# Patient Record
Sex: FEMALE | Race: WHITE | Employment: OTHER | URBAN - METROPOLITAN AREA
[De-identification: names, ages, dates, MRNs, and addresses within clinical notes are randomized per-mention and may not be internally consistent; named-entity substitution may affect disease eponyms.]

---

## 2017-04-02 ENCOUNTER — APPOINTMENT (OUTPATIENT)
Dept: CT IMAGING | Age: 77
End: 2017-04-02
Payer: MEDICARE

## 2017-04-02 ENCOUNTER — HOSPITAL ENCOUNTER (EMERGENCY)
Age: 77
Discharge: HOME | End: 2017-04-02
Attending: EMERGENCY MEDICINE
Payer: MEDICARE

## 2017-04-02 VITALS
HEART RATE: 75 BPM | OXYGEN SATURATION: 90 % | SYSTOLIC BLOOD PRESSURE: 108 MMHG | WEIGHT: 129.2 LBS | TEMPERATURE: 97.8 F | RESPIRATION RATE: 16 BRPM | DIASTOLIC BLOOD PRESSURE: 58 MMHG | HEIGHT: 63 IN | BODY MASS INDEX: 22.89 KG/M2

## 2017-04-02 DIAGNOSIS — S00.03XA SCALP HEMATOMA, INITIAL ENCOUNTER: Primary | ICD-10-CM

## 2017-04-02 PROCEDURE — 90715 TDAP VACCINE 7 YRS/> IM: CPT | Performed by: NURSE PRACTITIONER

## 2017-04-02 PROCEDURE — 72125 CT NECK SPINE W/O DYE: CPT

## 2017-04-02 PROCEDURE — 6360000002 HC RX W HCPCS: Performed by: NURSE PRACTITIONER

## 2017-04-02 PROCEDURE — 99283 EMERGENCY DEPT VISIT LOW MDM: CPT

## 2017-04-02 PROCEDURE — 90471 IMMUNIZATION ADMIN: CPT | Performed by: NURSE PRACTITIONER

## 2017-04-02 PROCEDURE — 70450 CT HEAD/BRAIN W/O DYE: CPT

## 2017-04-02 RX ORDER — FAMOTIDINE 20 MG/1
20 TABLET, FILM COATED ORAL 2 TIMES DAILY
COMMUNITY

## 2017-04-02 RX ORDER — OXYCODONE HYDROCHLORIDE AND ACETAMINOPHEN 5; 325 MG/1; MG/1
1 TABLET ORAL EVERY 4 HOURS PRN
COMMUNITY

## 2017-04-02 RX ORDER — FUROSEMIDE 20 MG/1
40 TABLET ORAL NIGHTLY
COMMUNITY

## 2017-04-02 RX ORDER — PRIMIDONE 50 MG/1
50 TABLET ORAL 2 TIMES DAILY
COMMUNITY

## 2017-04-02 RX ORDER — CITALOPRAM 20 MG/1
20 TABLET ORAL DAILY
COMMUNITY

## 2017-04-02 RX ADMIN — TETANUS TOXOID, REDUCED DIPHTHERIA TOXOID AND ACELLULAR PERTUSSIS VACCINE, ADSORBED 0.5 ML: 5; 2.5; 8; 8; 2.5 SUSPENSION INTRAMUSCULAR at 18:22

## 2017-04-02 ASSESSMENT — ENCOUNTER SYMPTOMS
SHORTNESS OF BREATH: 0
NAUSEA: 0
DIARRHEA: 0
SORE THROAT: 0
CONSTIPATION: 0
ABDOMINAL PAIN: 0
COUGH: 0
VOMITING: 0
SINUS PRESSURE: 0
RHINORRHEA: 0
WHEEZING: 0
COLOR CHANGE: 0

## 2017-04-02 ASSESSMENT — PAIN SCALES - GENERAL: PAINLEVEL_OUTOF10: 5

## 2018-10-17 ENCOUNTER — OFFICE VISIT (OUTPATIENT)
Dept: DERMATOLOGY | Age: 78
End: 2018-10-17
Payer: COMMERCIAL

## 2018-10-17 VITALS
HEART RATE: 64 BPM | OXYGEN SATURATION: 98 % | WEIGHT: 118.6 LBS | BODY MASS INDEX: 21.01 KG/M2 | DIASTOLIC BLOOD PRESSURE: 71 MMHG | SYSTOLIC BLOOD PRESSURE: 132 MMHG

## 2018-10-17 DIAGNOSIS — Q82.5 CONGENITAL NEVUS: ICD-10-CM

## 2018-10-17 DIAGNOSIS — L81.4 SOLAR LENTIGO: ICD-10-CM

## 2018-10-17 DIAGNOSIS — L82.1 SEBORRHEIC KERATOSES: Primary | ICD-10-CM

## 2018-10-17 DIAGNOSIS — I87.8 VENOUS STASIS: ICD-10-CM

## 2018-10-17 PROCEDURE — 99202 OFFICE O/P NEW SF 15 MIN: CPT | Performed by: DERMATOLOGY

## 2018-10-17 NOTE — PATIENT INSTRUCTIONS
Seborrheic Keratosis  Seborrheic keratoses are common benign growths of unknown cause seen in adults due to a thickening of an area of the top skin layer. Who's At Risk  Although they can occur anytime after puberty, almost everyone over 48 has one or more of these and they increase in number with age. Some families have an inherited tendency to grow multiple lesions. Men and women are equally as likely to develop seborrheic keratoses. Dark-skinned people are less affected than those with light skin; a variant seen in blacks is called dermatosis papulosa nigra. Signs & Symptoms  One or more spots can occur anywhere on the body, except for palms, soles, and mucous membranes (eg, in the mouth or rectum). They do not go away. They do not turn into cancers, but some cancers resemble seborrheic keratosis. They start as light brown to skin-colored, flat areas, which are round to oval and of varying size (usually less than a half inch, but sometimes much larger). As they grow thicker and rise above the skin surface, seborrheic keratoses may become dark brown to almost black with a \"stuck on\" appearance. The surface may feel smooth or rough. Self-Care Guidelines  No treatment is needed unless there is irritation from clothing with itching or bleeding. There is no way to prevent new spots from forming. Some lotions with alpha hydroxyl acids may make the areas feel smoother with regular use but will not eliminate them. OTC freezing techniques are available but usually not effective. When to [facility]  If a spot on the skin is growing, bleeding, painful, or itchy, or any other concerning changes, then see your doctor.     Follow up as needed

## 2018-10-17 NOTE — PROGRESS NOTES
Dermatology Patient Note  [address] [place] [address] DERMATOLOGY  [address]  [address] [place] [address]  Dept: [phone]  Dept Fax: [phone]      VISITDATE: 10/17/2018   REFERRING PROVIDER: No ref. provider found      Fernando Dunn is a 69 y.o. female  who presents today in the office for:    New Patient (mole on side of forehead also wants to review birth barb on back.)      HISTORY OF PRESENT ILLNESS:  Patient presents for lesion  Location: right forehead  Duration: several months-years  Symptoms: sometimes itchy, especially after dying hair when dye gets into it  Course: stable  Exacerbating factors: hair dye  Prior treatments: none    Has a brown birth barb on her back, has not changed as far as she's aware      CURRENT MEDICATIONS:   Current Outpatient Prescriptions   Medication Sig Dispense Refill    aspirin 81 MG tablet Take 81 mg by mouth daily      Metoprolol Succinate (TOPROL XL PO) Take by mouth      famotidine (PEPCID) 20 MG tablet Take 20 mg by mouth 2 times daily      citalopram (CELEXA) 20 MG tablet Take 20 mg by mouth daily      furosemide (LASIX) 20 MG tablet Take 20 mg by mouth 2 times daily      oxyCODONE-acetaminophen (PERCOCET) 2.5-325 MG per tablet Take 1 tablet by mouth every 4 hours as needed for Pain .  primidone (MYSOLINE) 50 MG tablet Take 50 mg by mouth 3 times daily       No current facility-administered medications for this visit. ALLERGIES:   No Known Allergies    SOCIAL HISTORY:  Social History   Substance Use Topics    Smoking status: Never Smoker    Smokeless tobacco: Never Used    Alcohol use 3.0 oz/week     5 Glasses of wine per week       REVIEW OF SYSTEMS:  Review of Systems   Constitutional: Negative.       Skin: Denies any new changing, growing orbleeding lesions or rashes except as described in the HPI     PHYSICAL EXAM:   /71 (Site: Right Upper Arm, Position: Sitting, Cuff Size: Medium Adult)   Pulse 64   Wt 118 lb 9.6 oz (53.8

## 2019-04-29 ENCOUNTER — APPOINTMENT (OUTPATIENT)
Dept: GENERAL RADIOLOGY | Age: 79
End: 2019-04-29
Payer: MEDICARE

## 2019-04-29 ENCOUNTER — HOSPITAL ENCOUNTER (EMERGENCY)
Age: 79
Discharge: HOME | End: 2019-04-29
Attending: EMERGENCY MEDICINE
Payer: MEDICARE

## 2019-04-29 VITALS
TEMPERATURE: 97.6 F | HEIGHT: 63 IN | WEIGHT: 128 LBS | SYSTOLIC BLOOD PRESSURE: 118 MMHG | DIASTOLIC BLOOD PRESSURE: 56 MMHG | RESPIRATION RATE: 14 BRPM | OXYGEN SATURATION: 98 % | HEART RATE: 65 BPM | BODY MASS INDEX: 22.68 KG/M2

## 2019-04-29 DIAGNOSIS — S81.812A NONINFECTED SKIN TEAR OF LEFT LOWER EXTREMITY, INITIAL ENCOUNTER: Primary | ICD-10-CM

## 2019-04-29 PROCEDURE — 6370000000 HC RX 637 (ALT 250 FOR IP): Performed by: NURSE PRACTITIONER

## 2019-04-29 PROCEDURE — 99283 EMERGENCY DEPT VISIT LOW MDM: CPT

## 2019-04-29 PROCEDURE — 73590 X-RAY EXAM OF LOWER LEG: CPT

## 2019-04-29 PROCEDURE — 73610 X-RAY EXAM OF ANKLE: CPT

## 2019-04-29 RX ORDER — IBUPROFEN 600 MG/1
600 TABLET ORAL ONCE
Status: COMPLETED | OUTPATIENT
Start: 2019-04-29 | End: 2019-04-29

## 2019-04-29 RX ORDER — CEPHALEXIN 500 MG/1
500 CAPSULE ORAL 2 TIMES DAILY
Qty: 14 CAPSULE | Refills: 0 | Status: SHIPPED | OUTPATIENT
Start: 2019-04-29 | End: 2019-05-28

## 2019-04-29 RX ORDER — PHENOL 1.4 %
10 AEROSOL, SPRAY (ML) MUCOUS MEMBRANE NIGHTLY
COMMUNITY

## 2019-04-29 RX ADMIN — IBUPROFEN 600 MG: 600 TABLET ORAL at 13:53

## 2019-04-29 ASSESSMENT — ENCOUNTER SYMPTOMS
COUGH: 0
NAUSEA: 0
SORE THROAT: 0
SINUS PRESSURE: 0
ABDOMINAL PAIN: 0
VOMITING: 0
WHEEZING: 0
COLOR CHANGE: 0
CONSTIPATION: 0
SHORTNESS OF BREATH: 0
DIARRHEA: 0
RHINORRHEA: 0

## 2019-04-29 ASSESSMENT — PAIN SCALES - GENERAL
PAINLEVEL_OUTOF10: 2
PAINLEVEL_OUTOF10: 4

## 2019-04-29 ASSESSMENT — PAIN DESCRIPTION - ORIENTATION: ORIENTATION: LEFT;LOWER

## 2019-04-29 ASSESSMENT — PAIN DESCRIPTION - DESCRIPTORS: DESCRIPTORS: SORE

## 2019-04-29 ASSESSMENT — PAIN DESCRIPTION - PAIN TYPE: TYPE: ACUTE PAIN

## 2019-04-29 ASSESSMENT — PAIN DESCRIPTION - LOCATION: LOCATION: LEG
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
## 2019-04-29 NOTE — ED NOTES
RN(writer) in to check on patient. Patient states she now has some pain in her left lower leg which she rates a 4 on a 0-10 scale at this time. Patient requests motrin for the discomfort. Lynette Whiting NP updated.       Murali Rodriguez RN  04/29/19 0751
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
## 2019-05-28 ENCOUNTER — FOLLOWUP TELEPHONE ENCOUNTER (OUTPATIENT)
Dept: WOUND CARE | Age: 79
End: 2019-05-28

## 2019-05-28 ENCOUNTER — HOSPITAL ENCOUNTER (OUTPATIENT)
Dept: WOUND CARE | Age: 79
Discharge: HOME | End: 2019-05-28
Payer: MEDICARE

## 2019-05-28 VITALS
RESPIRATION RATE: 12 BRPM | HEART RATE: 70 BPM | HEIGHT: 63 IN | WEIGHT: 128 LBS | DIASTOLIC BLOOD PRESSURE: 62 MMHG | SYSTOLIC BLOOD PRESSURE: 119 MMHG | BODY MASS INDEX: 22.68 KG/M2 | TEMPERATURE: 97.7 F

## 2019-05-28 DIAGNOSIS — L97.821 ULCER OF LEFT SHIN LIMITED TO BREAKDOWN OF SKIN (HCC): ICD-10-CM

## 2019-05-28 PROBLEM — B35.3 TINEA PEDIS: Status: ACTIVE | Noted: 2019-05-28

## 2019-05-28 PROCEDURE — 99212 OFFICE O/P EST SF 10 MIN: CPT

## 2019-05-28 ASSESSMENT — PAIN DESCRIPTION - DESCRIPTORS: DESCRIPTORS: OTHER (COMMENT);BURNING

## 2019-05-28 ASSESSMENT — PAIN DESCRIPTION - LOCATION: LOCATION: LEG

## 2019-05-28 ASSESSMENT — PAIN DESCRIPTION - PAIN TYPE: TYPE: ACUTE PAIN

## 2019-05-28 ASSESSMENT — PAIN DESCRIPTION - PROGRESSION: CLINICAL_PROGRESSION: GRADUALLY IMPROVING

## 2019-05-28 ASSESSMENT — PAIN DESCRIPTION - ONSET: ONSET: ON-GOING

## 2019-05-28 ASSESSMENT — PAIN DESCRIPTION - FREQUENCY: FREQUENCY: CONTINUOUS

## 2019-05-28 ASSESSMENT — PAIN SCALES - GENERAL: PAINLEVEL_OUTOF10: 2

## 2019-05-28 ASSESSMENT — PAIN - FUNCTIONAL ASSESSMENT: PAIN_FUNCTIONAL_ASSESSMENT: ACTIVITIES ARE NOT PREVENTED
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
## 2019-05-28 NOTE — PROGRESS NOTES
Pt called back, states she was informed to keep appointment and she will be here today .      Electronically signed by Rhett Payton RN on 5/28/2019 at 10:38 AM

## 2019-05-28 NOTE — PROGRESS NOTES
Rec'd call from patient at 8am this morning. Pt wants to cancel new patient appointment today as she states her wound is \"only the size of the tip of my finger\" . Writer informed patient she may cancel, however informed she should contact office of Dr. Wes Martin/Elana due to being referred from there. Pt was referred by Dr. Kathy Carranza to Dr. Marisela Carney. Writer educated patient that sometimes wounds are small, but they need help healing and should be monitored. Informed pt that as that office knows about the wound, they would be best to determine if needed to keep the wound care appointment or not. Writer informed patient she would be seeing Dr. María Elena Levin partner today, Dr. Marisela Carney. Pt stated understanding. Writer provided office number.     Electronically signed by Dorene Gibbs RN on 5/28/2019 at 8:11 AM

## 2019-05-30 NOTE — PLAN OF CARE
Assessment on admission. Repeat at each visit. Complete Pain Assessment every visit. Refer non-wound-related pain management to Primary Care Physician. Implement appropriate pain management protocol. Complete Educational Assessment on admission and update if appropriate. Provider orders culture of wound bed post debridement as indicated, using         Banks technique. Provider to treat infection based upon C&S results. Provider to refer to infectious disease as indicated. Provider to order radiology/nuclear tests as indicated. Provider to order labs as indicated. Notify provider of lab and procedure results. CONSULT VISIT. NO OPEN WOUND. WRITER PRESENT AS DR CORONADO EXPLAINED TO PATIENT TO KEEP ANTIBIOTIC OINTMENT ON WOUND DAILY AND DRESSING TO PROTECT. SCAB IS CLEAN AND DRY.            Problem: Arterial:  Goal: Optimize blood flow for wound healing  Description  Optimize blood flow for wound healing  Outcome: Completed  Note:   SCREENING FLORECITA IN CLINIC  RIGHT : 1.08 MMHG  LEFT: 1.02 MMHG      Problem: Venous:  Goal: Signs of wound healing will improve  Description  Signs of wound healing will improve  Outcome: Completed     Problem: Smoking cessation:  Goal: Ability to formulate a plan to maintain a tobacco-free life will be supported  Description  Ability to formulate a plan to maintain a tobacco-free life will be supported  Outcome: Completed  Note:   NEVER SMOKER      Problem: Falls - Risk of:  Goal: Will remain free from falls  Description  Will remain free from falls  Outcome: Completed     Problem: Blood Glucose:  Goal: Ability to maintain appropriate glucose levels will improve  Description  Ability to maintain appropriate glucose levels will improve  Outcome: Completed

## 2024-06-04 ENCOUNTER — APPOINTMENT (OUTPATIENT)
Dept: CT IMAGING | Age: 84
End: 2024-06-04
Payer: MEDICARE

## 2024-06-04 ENCOUNTER — HOSPITAL ENCOUNTER (EMERGENCY)
Age: 84
Discharge: HOME | End: 2024-06-04
Attending: EMERGENCY MEDICINE
Payer: MEDICARE

## 2024-06-04 VITALS
SYSTOLIC BLOOD PRESSURE: 122 MMHG | HEART RATE: 72 BPM | OXYGEN SATURATION: 96 % | HEIGHT: 62 IN | DIASTOLIC BLOOD PRESSURE: 57 MMHG | RESPIRATION RATE: 18 BRPM | TEMPERATURE: 97.8 F | BODY MASS INDEX: 22.45 KG/M2 | WEIGHT: 122 LBS

## 2024-06-04 DIAGNOSIS — S20.219A CONTUSION OF CHEST WALL, UNSPECIFIED LATERALITY, INITIAL ENCOUNTER: Primary | ICD-10-CM
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
## 2024-06-04 LAB
ANION GAP SERPL CALCULATED.3IONS-SCNC: 10 MMOL/L (ref 9–17)
BASOPHILS # BLD: 0.07 K/UL (ref 0–0.2)
BASOPHILS NFR BLD: 1 % (ref 0–2)
BUN SERPL-MCNC: 13 MG/DL (ref 8–23)
BUN/CREAT SERPL: 22 (ref 9–20)
CALCIUM SERPL-MCNC: 9 MG/DL (ref 8.6–10.4)
CHLORIDE SERPL-SCNC: 100 MMOL/L (ref 98–107)
CO2 SERPL-SCNC: 25 MMOL/L (ref 20–31)
CREAT SERPL-MCNC: 0.6 MG/DL (ref 0.5–0.9)
EOSINOPHIL # BLD: 0.12 K/UL (ref 0–0.44)
EOSINOPHILS RELATIVE PERCENT: 1 % (ref 1–4)
ERYTHROCYTE [DISTWIDTH] IN BLOOD BY AUTOMATED COUNT: 13.6 % (ref 11.8–14.4)
GFR, ESTIMATED: 90 ML/MIN/1.73M2
GLUCOSE SERPL-MCNC: 81 MG/DL (ref 70–99)
HCT VFR BLD AUTO: 42 % (ref 36.3–47.1)
HGB BLD-MCNC: 14 G/DL (ref 11.9–15.1)
IMM GRANULOCYTES # BLD AUTO: 0.02 K/UL (ref 0–0.3)
IMM GRANULOCYTES NFR BLD: 0 %
LYMPHOCYTES NFR BLD: 2.35 K/UL (ref 1.1–3.7)
LYMPHOCYTES RELATIVE PERCENT: 25 % (ref 24–43)
MCH RBC QN AUTO: 32.1 PG (ref 25.2–33.5)
MCHC RBC AUTO-ENTMCNC: 33.3 G/DL (ref 28.4–34.8)
MCV RBC AUTO: 96.3 FL (ref 82.6–102.9)
MONOCYTES NFR BLD: 0.67 K/UL (ref 0.1–1.2)
MONOCYTES NFR BLD: 7 % (ref 3–12)
NEUTROPHILS NFR BLD: 66 % (ref 36–65)
NEUTS SEG NFR BLD: 6.3 K/UL (ref 1.5–8.1)
NRBC BLD-RTO: 0 PER 100 WBC
PLATELET # BLD AUTO: 219 K/UL (ref 138–453)
PMV BLD AUTO: 9.2 FL (ref 8.1–13.5)
POTASSIUM SERPL-SCNC: 4.2 MMOL/L (ref 3.7–5.3)
RBC # BLD AUTO: 4.36 M/UL (ref 3.95–5.11)
SODIUM SERPL-SCNC: 135 MMOL/L (ref 135–144)
WBC OTHER # BLD: 9.5 K/UL (ref 3.5–11.3)

## 2024-06-04 PROCEDURE — 80048 BASIC METABOLIC PNL TOTAL CA: CPT

## 2024-06-04 PROCEDURE — 71260 CT THORAX DX C+: CPT

## 2024-06-04 PROCEDURE — 96374 THER/PROPH/DIAG INJ IV PUSH: CPT

## 2024-06-04 PROCEDURE — 6360000004 HC RX CONTRAST MEDICATION: Performed by: NURSE PRACTITIONER

## 2024-06-04 PROCEDURE — 2580000003 HC RX 258: Performed by: NURSE PRACTITIONER

## 2024-06-04 PROCEDURE — 96375 TX/PRO/DX INJ NEW DRUG ADDON: CPT

## 2024-06-04 PROCEDURE — 6360000002 HC RX W HCPCS: Performed by: NURSE PRACTITIONER

## 2024-06-04 PROCEDURE — 99285 EMERGENCY DEPT VISIT HI MDM: CPT

## 2024-06-04 PROCEDURE — 85025 COMPLETE CBC W/AUTO DIFF WBC: CPT

## 2024-06-04 RX ORDER — ONDANSETRON 2 MG/ML
4 INJECTION INTRAMUSCULAR; INTRAVENOUS ONCE
Status: COMPLETED | OUTPATIENT
Start: 2024-06-04 | End: 2024-06-04

## 2024-06-04 RX ORDER — SODIUM CHLORIDE 9 MG/ML
INJECTION, SOLUTION INTRAVENOUS CONTINUOUS
Status: DISCONTINUED | OUTPATIENT
Start: 2024-06-04 | End: 2024-06-04 | Stop reason: HOSPADM

## 2024-06-04 RX ORDER — 0.9 % SODIUM CHLORIDE 0.9 %
100 INTRAVENOUS SOLUTION INTRAVENOUS ONCE
Status: COMPLETED | OUTPATIENT
Start: 2024-06-04 | End: 2024-06-04

## 2024-06-04 RX ORDER — SODIUM CHLORIDE 0.9 % (FLUSH) 0.9 %
10 SYRINGE (ML) INJECTION PRN
Status: DISCONTINUED | OUTPATIENT
Start: 2024-06-04 | End: 2024-06-04 | Stop reason: HOSPADM

## 2024-06-04 RX ORDER — MORPHINE SULFATE 2 MG/ML
2 INJECTION, SOLUTION INTRAMUSCULAR; INTRAVENOUS ONCE
Status: COMPLETED | OUTPATIENT
Start: 2024-06-04 | End: 2024-06-04

## 2024-06-04 RX ADMIN — SODIUM CHLORIDE: 9 INJECTION, SOLUTION INTRAVENOUS at 16:50

## 2024-06-04 RX ADMIN — IOPAMIDOL 75 ML: 755 INJECTION, SOLUTION INTRAVENOUS at 17:29

## 2024-06-04 RX ADMIN — SODIUM CHLORIDE, PRESERVATIVE FREE 10 ML: 5 INJECTION INTRAVENOUS at 17:29

## 2024-06-04 RX ADMIN — ONDANSETRON 4 MG: 2 INJECTION INTRAMUSCULAR; INTRAVENOUS at 16:50

## 2024-06-04 RX ADMIN — MORPHINE SULFATE 2 MG: 2 INJECTION, SOLUTION INTRAMUSCULAR; INTRAVENOUS at 16:52

## 2024-06-04 RX ADMIN — SODIUM CHLORIDE 100 ML: 9 INJECTION, SOLUTION INTRAVENOUS at 17:29

## 2024-06-04 ASSESSMENT — PAIN DESCRIPTION - DESCRIPTORS
DESCRIPTORS: ACHING;DISCOMFORT
DESCRIPTORS: STABBING

## 2024-06-04 ASSESSMENT — PAIN SCALES - GENERAL
PAINLEVEL_OUTOF10: 6
PAINLEVEL_OUTOF10: 6

## 2024-06-04 ASSESSMENT — PAIN DESCRIPTION - FREQUENCY: FREQUENCY: CONTINUOUS

## 2024-06-04 ASSESSMENT — ENCOUNTER SYMPTOMS
BACK PAIN: 0
NAUSEA: 0
ABDOMINAL PAIN: 0
SHORTNESS OF BREATH: 0
VOMITING: 0
COLOR CHANGE: 0

## 2024-06-04 ASSESSMENT — PAIN - FUNCTIONAL ASSESSMENT: PAIN_FUNCTIONAL_ASSESSMENT: 0-10

## 2024-06-04 ASSESSMENT — PAIN DESCRIPTION - LOCATION
LOCATION: RIB CAGE;STERNUM
LOCATION: CHEST

## 2024-06-04 ASSESSMENT — PAIN DESCRIPTION - ORIENTATION: ORIENTATION: MID

## 2024-06-04 NOTE — ED PROVIDER NOTES
symptoms worsen, As needed      DISCHARGE MEDICATIONS:     New Prescriptions    No medications on file           (Please note that portions of this note were completed with a voice recognition program.  Efforts were made to edit the dictations but occasionally words are mis-transcribed.)    OG Hernandez CNP, Kristin M, APRN - CNP  06/05/24 1330      eMERGENCY dEPARTMENT eNCOUnter   Independent Attestation     Pt Name: Karyn Geiger  MRN: [id number]  Birthdate 1940  Date of evaluation: 6/5/24     Karyn Geiger is a 83 y.o. female with CC: Fall (Fell in bathroom onto hand bar, pain to chest, takes daily  aspirin)      Based on the medical record the care appears appropriate.  I was personally available for consultation in the Emergency Department.    Inder Ritter MD  Attending Emergency Physician                 Inder Ritter MD  06/05/24 8680

## 2024-06-18 ENCOUNTER — OFFICE VISIT (OUTPATIENT)
Age: 84
End: 2024-06-18
Payer: MEDICARE

## 2024-06-18 VITALS — WEIGHT: 122 LBS | BODY MASS INDEX: 22.45 KG/M2 | HEIGHT: 62 IN

## 2024-06-18 DIAGNOSIS — S92.355A CLOSED NONDISPLACED FRACTURE OF FIFTH METATARSAL BONE OF LEFT FOOT, INITIAL ENCOUNTER: Primary | ICD-10-CM

## 2024-06-18 PROCEDURE — 28470 CLTX METATARSAL FX WO MNP EA: CPT | Performed by: ORTHOPAEDIC SURGERY

## 2024-06-18 PROCEDURE — 99204 OFFICE O/P NEW MOD 45 MIN: CPT | Performed by: ORTHOPAEDIC SURGERY

## 2024-06-18 PROCEDURE — 1123F ACP DISCUSS/DSCN MKR DOCD: CPT | Performed by: ORTHOPAEDIC SURGERY

## 2024-06-18 RX ORDER — PANTOPRAZOLE SODIUM 40 MG/1
40 TABLET, DELAYED RELEASE ORAL EVERY MORNING
COMMUNITY

## 2024-06-18 RX ORDER — FLUOXETINE 20 MG/1
20 TABLET ORAL DAILY
COMMUNITY
Start: 2024-05-16

## 2024-06-18 RX ORDER — FLUOXETINE 20 MG/1
20 TABLET, FILM COATED ORAL EVERY MORNING
COMMUNITY
Start: 2024-05-16

## 2024-06-18 RX ORDER — PSEUDOEPHEDRINE HCL 30 MG
200 TABLET ORAL
COMMUNITY

## 2024-06-18 RX ORDER — ROSUVASTATIN CALCIUM 10 MG/1
1 TABLET, COATED ORAL EVERY MORNING
COMMUNITY
Start: 2024-05-22

## 2024-06-18 RX ORDER — FLUCONAZOLE 150 MG/1
TABLET ORAL
COMMUNITY
Start: 2024-05-16

## 2024-06-18 RX ORDER — SUCRALFATE 1 G/1
TABLET ORAL
COMMUNITY

## 2024-06-18 RX ORDER — AMOXICILLIN 500 MG/1
TABLET, FILM COATED ORAL
COMMUNITY
Start: 2024-06-13

## 2024-06-18 RX ORDER — PROMETHAZINE HYDROCHLORIDE 12.5 MG/1
12.5 TABLET ORAL EVERY 6 HOURS PRN
COMMUNITY
Start: 2023-06-05

## 2024-06-18 RX ORDER — DIAZEPAM 5 MG/1
TABLET ORAL
COMMUNITY
Start: 2024-03-11
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
## 2024-07-31 ENCOUNTER — OFFICE VISIT (OUTPATIENT)
Age: 84
End: 2024-07-31

## 2024-07-31 VITALS — HEIGHT: 62 IN | BODY MASS INDEX: 22.45 KG/M2 | WEIGHT: 122 LBS

## 2024-07-31 DIAGNOSIS — S92.355A CLOSED NONDISPLACED FRACTURE OF FIFTH METATARSAL BONE OF LEFT FOOT, INITIAL ENCOUNTER: Primary | ICD-10-CM

## 2024-07-31 PROCEDURE — 99024 POSTOP FOLLOW-UP VISIT: CPT

## 2024-07-31 NOTE — PROGRESS NOTES
[facility] Orthopaedics & Sports Medicine      [facility]  [facility] ORTHOPAEDICS AND SPORTS MEDICINE  [address]  [place] [address]  Dept: [phone]  Dept Fax: [phone]    Chief Compliant:  Chief Complaint   Patient presents with    Fracture     Left foot fx f/u        History of Present Illness:  This is a pleasant 83 y.o. female who presents to the clinic today for evaluation / follow up of left fifth metatarsal fracture.  Patient states that she has been wearing the boot.  She states that her pain has improved however she still has achiness over the fifth metatarsal at this point.  She presents today for further evaluation.      Physical Exam:    Left foot: Patient able plantarflex and dorsiflex the foot.  She can invert and pako her ankle.  She has tenderness palpation over the fifth metatarsal.  She does have some tenderness palpation the base of the fourth toe.  Overlying skin is intact.  Sensation intact light touch.    Nursing note and vitals reviewed.     Labs and Imaging:     3 views left foot show oblique fracture of the fifth metatarsal with a little bit of callus formation present within the fracture site.  No new acute bony abnormalities present.       Orders Placed This Encounter   Procedures    XR FOOT LEFT (MIN 3 VIEWS)       Assessment and Plan:  1. Closed nondisplaced fracture of fifth metatarsal bone of left foot, initial encounter          This is a pleasant 83 y.o. female with left fifth metatarsal fracture.  This point informed patient that I am fine with her weaning herself out of the boot as she is able to tolerate.  If she is walking a regular shoe, I would like for her to walk more so on her heel to help offload the fracture site still at this point.  As far as pain goes, she can take Tylenol and ibuprofen as needed for pain.  I would like for her to come back in for 6 weeks with repeat x-ray that way

## 2024-09-11 ENCOUNTER — OFFICE VISIT (OUTPATIENT)
Age: 84
End: 2024-09-11

## 2024-09-11 VITALS — BODY MASS INDEX: 22.45 KG/M2 | WEIGHT: 122 LBS | HEIGHT: 62 IN

## 2024-09-11 DIAGNOSIS — S92.355A CLOSED NONDISPLACED FRACTURE OF FIFTH METATARSAL BONE OF LEFT FOOT, INITIAL ENCOUNTER: Primary | ICD-10-CM

## 2024-09-11 PROCEDURE — 99024 POSTOP FOLLOW-UP VISIT: CPT

## 2024-10-09 ENCOUNTER — OFFICE VISIT (OUTPATIENT)
Age: 84
End: 2024-10-09
Payer: MEDICARE

## 2024-10-09 DIAGNOSIS — M79.641 RIGHT HAND PAIN: ICD-10-CM

## 2024-10-09 DIAGNOSIS — S92.355A CLOSED NONDISPLACED FRACTURE OF FIFTH METATARSAL BONE OF LEFT FOOT, INITIAL ENCOUNTER: Primary | ICD-10-CM

## 2024-10-09 PROCEDURE — 1123F ACP DISCUSS/DSCN MKR DOCD: CPT

## 2024-10-09 PROCEDURE — 99214 OFFICE O/P EST MOD 30 MIN: CPT

## 2024-10-09 NOTE — PROGRESS NOTES
[facility] Orthopaedics & Sports Medicine      [facility] SPORTS MEDICINE  [address]  [place] [address]  Dept: [phone]  Dept Fax: [phone]    Chief Compliant:  No chief complaint on file.       History of Present Illness:  This is a pleasant 83 y.o. female who presents to the clinic today for evaluation / follow up of left fifth metatarsal fracture as well as right hand pain.  Patient presents today for repeat x-ray of her left foot.  She states that she is improving overall.  She still has pain on occasion which is of her foot however her pain is much better otherwise previously.  She is completely weaned herself off the cam boot at this point.  Regarding her right hand, she states that she has had ongoing pain in her right hand especially the base of her thumb for about 6 months.  She has noticed that her thumb as well.  She is been wearing compression gloves as needed.  She takes Tylenol on occasion for pain.  Patient states that she placed a PNO and this pain worsens while she is playing.  She presents today for further evaluation.      Physical Exam:    Left foot/ankle: Patient is able perform plantarflexion dorsiflexion.  She can form inversion eversion.  She has some mild tenderness over the fifth metatarsal on exam today.  She also has a little bit of tenderness over the lateral malleoli this on exam today.  No tenderness of the medial malleolus.  No tenderness of the calcaneus on exam today.  Dorsalis pedis pulses 2+.  Sensations intact light touch.    Right hand: Patient is able to flex and extend her fingers.  She and abduct and adduct her fingers.  She flex extend the wrist as well as perform radial and ulnar deviation of the wrist.  She has positive CMC grind test.  Negative Finkelstein test.  No tenderness palpation over the A1 pulleys.  Negative Tinel's at the carpal tunnel.  Negative